# Patient Record
Sex: MALE | Race: OTHER | NOT HISPANIC OR LATINO | URBAN - METROPOLITAN AREA
[De-identification: names, ages, dates, MRNs, and addresses within clinical notes are randomized per-mention and may not be internally consistent; named-entity substitution may affect disease eponyms.]

---

## 2019-08-25 ENCOUNTER — EMERGENCY (EMERGENCY)
Facility: HOSPITAL | Age: 24
LOS: 0 days | Discharge: HOME | End: 2019-08-25
Admitting: EMERGENCY MEDICINE
Payer: COMMERCIAL

## 2019-08-25 VITALS
HEART RATE: 68 BPM | RESPIRATION RATE: 18 BRPM | SYSTOLIC BLOOD PRESSURE: 158 MMHG | DIASTOLIC BLOOD PRESSURE: 91 MMHG | TEMPERATURE: 98 F | WEIGHT: 289.91 LBS | OXYGEN SATURATION: 97 %

## 2019-08-25 DIAGNOSIS — K08.89 OTHER SPECIFIED DISORDERS OF TEETH AND SUPPORTING STRUCTURES: ICD-10-CM

## 2019-08-25 PROCEDURE — 99283 EMERGENCY DEPT VISIT LOW MDM: CPT

## 2019-08-25 RX ORDER — AMOXICILLIN 250 MG/5ML
1 SUSPENSION, RECONSTITUTED, ORAL (ML) ORAL
Qty: 20 | Refills: 0
Start: 2019-08-25 | End: 2019-09-03

## 2019-08-25 RX ORDER — IBUPROFEN 200 MG
600 TABLET ORAL ONCE
Refills: 0 | Status: COMPLETED | OUTPATIENT
Start: 2019-08-25 | End: 2019-08-25

## 2019-08-25 RX ADMIN — Medication 600 MILLIGRAM(S): at 14:52

## 2019-08-25 NOTE — ED PROVIDER NOTE - PHYSICAL EXAMINATION
VITALS:  I have reviewed the initial vital signs.  GENERAL: Well-developed, well-nourished, in no acute distress. Nontoxic.  HEENT: Sclera clear. No conjunctival injection. EOMI, PERRLA. Tolerating oral secretions. No trismus. tooth #31/32 ttp. no abscess, bleeding, drainage. poor dentition. Mucous membranes moist, oropharynx nonerythematous without swelling or lesions. Tonsils 2+ bilaterally and w/o exudate. Uvula midline and without edema. TM's clear b/l without bulging or erythema. Nasal turbinates clear and w/o discharge. No sinus ttp.  NECK: supple w FROM. No cervical adenopathy.  CARDIO: RRR, nl S1 and S2. No murmurs, rubs, or gallops.  PULM: Normal effort. No tachypnea or retractions. CTA b/l without wheezes, rales, or rhonchi.  SKIN: Warm, dry. No pallor or rashes. Capillary refill <2 seconds.  NEURO: A&Ox3. Speech clear. No gross motor/sensory deficits.

## 2019-08-25 NOTE — ED PROVIDER NOTE - OBJECTIVE STATEMENT
24 year old male w no significant pmhx presents to the ED with right lower tooth pain x 5 days. Pain is intermittent, throbbing, radiates into his right ear, worse when chewing on the affected side. Denies fevers/chills, URI sx, sore throat, difficulty swallowing/opening jaw, decreased po, myalgias, injury/trauma, recent dental procedures. States he does not have a dentist nor see one regularly.

## 2019-08-25 NOTE — ED PROVIDER NOTE - CLINICAL SUMMARY MEDICAL DECISION MAKING FREE TEXT BOX
24 year old healthy male here with 5 days of right lower dental pain to tooth #31/32, poor dentition. amoxicillin sent to pharmacy. patient to f/u with dental. I have discussed the discharge plan with the patient. The patient agrees with the plan, as discussed.  The patient understands Emergency Department diagnosis is a preliminary diagnosis often based on limited information and that the patient must adhere to the follow-up plan as discussed.  The patient understands that if the symptoms worsen or if prescribed medications do not have the desired/planned effect that the patient may return to the Emergency Department at any time for further evaluation and treatment.

## 2019-08-25 NOTE — ED PROVIDER NOTE - NSFOLLOWUPCLINICS_GEN_ALL_ED_FT
[facility]  Dental  [address] [place] [id number]  Phone: [phone]  Fax:   Follow Up Time: 1-3 Days

## 2019-08-25 NOTE — ED PROVIDER NOTE - NS ED ROS FT
CONSTITUTIONAL: (-) fevers, (-) chills, (-) decreased appetite  EYES: (-) eye redness, (-) eye discharge, (-) tearing  ENT: see HPI  NECK: (-) neck pain, (-) neck stiffness, (-) lymphadenopathy  PULM: (-) cough, (-) sputum, (-) shortness of breath  GI: (-) nausea, (-) vomiting    *all other systems negative except as documented above and in the HPI*

## 2019-08-26 ENCOUNTER — EMERGENCY (EMERGENCY)
Facility: HOSPITAL | Age: 24
LOS: 0 days | Discharge: HOME | End: 2019-08-26
Attending: EMERGENCY MEDICINE | Admitting: EMERGENCY MEDICINE
Payer: COMMERCIAL

## 2019-08-26 VITALS
TEMPERATURE: 98 F | RESPIRATION RATE: 18 BRPM | SYSTOLIC BLOOD PRESSURE: 139 MMHG | OXYGEN SATURATION: 99 % | DIASTOLIC BLOOD PRESSURE: 85 MMHG | HEART RATE: 66 BPM

## 2019-08-26 DIAGNOSIS — K08.89 OTHER SPECIFIED DISORDERS OF TEETH AND SUPPORTING STRUCTURES: ICD-10-CM

## 2019-08-26 PROBLEM — Z78.9 OTHER SPECIFIED HEALTH STATUS: Chronic | Status: ACTIVE | Noted: 2019-08-25

## 2019-08-26 PROCEDURE — 99282 EMERGENCY DEPT VISIT SF MDM: CPT

## 2019-08-26 NOTE — CONSULT NOTE ADULT - SUBJECTIVE AND OBJECTIVE BOX
S: 23 yo male patient presented to clinic with lower right swelling and pain.     O: Lower right, slight swelling observed. No fever seen.     A: Moderate swelling seen on mandibular right side. Periapical pathology seen on #30 and #31 (both teeth previously treated with root canal therapy and have crowns present). Radiograph taken of #30 and #31. Percussion test showed that #30 had pain of 10 and #31 had pain of 2. Determined that #30 was the source of the patient's pain at this visit.     P: Recommended extraction of #30 or re-treat of root canal therapy of #30. Patient elected to have #30 extracted at this visit. Explained risks and benefits of procedure as per OS sheet dated 07/13/2000. Consent obtained and side site completed. Anesthetized using 4 carpules 4% septocaine with 1:100,000 epinephrine and 3 carpules mepivicaine via inferior alveolar block and buccal infiltration. Elevated using elevators and delivered with cowhorn forceps. Hemostasis acquired with pressure. Curetted area. Post-op radiograph taken. Post-op instructions given. No complications.     Prescribed 600 mg ibuprofen for pain and 500 mg amoxicillin for infection.     Recommended that the patient find a private dentist to have routine dental care. Informed patient of periapical lesion that is seen on #31 and that there is an infection present. Informed the patient that he needs to have that tooth taken care of due to potential of another infection in the area. Patient acknowledges and understands.     Barb Greene DDS, [id number] (spectra number)

## 2019-08-26 NOTE — ED PROVIDER NOTE - NS ED ROS FT
Constitutional: See HPI.  ENMT: see hpi  Respiratory: No cough or sob  GI: No nausea, vomiting  Neuro: No headache  Skin: No skin rash.

## 2019-08-26 NOTE — ED PROVIDER NOTE - PHYSICAL EXAMINATION
CONSTITUTIONAL: WA / WN / NAD  HEAD: NCAT  EYES: PERRL; EOMI;   ENT:  + tenderness to palpation tooth #31  NECK: Supple  SKIN: Warm and dry;   NEURO: AAOx3  PSYCH: Memory Intact, Normal Affect

## 2019-08-26 NOTE — ED PROVIDER NOTE - OBJECTIVE STATEMENT
24 year old male no pmh presents here for dental pain. 24 year old male no pmh presents here for dental pain. Patient states pain is in the right side of bottom of his mouth. No fevers, no difficulty swallowing.

## 2019-08-26 NOTE — ED PROVIDER NOTE - CLINICAL SUMMARY MEDICAL DECISION MAKING FREE TEXT BOX
24 male here for dental clinic advised to return to ED when clinic open after visit yesterday no acute issues will discharge to dental clinic.

## 2019-08-26 NOTE — ED PROVIDER NOTE - ATTENDING CONTRIBUTION TO CARE
I personally evaluated the patient. I reviewed the Resident’s or Physician Assistant’s note (as assigned above), and agree with the findings and plan except as documented in my note.     24 male here for right molar pain. No constitutional symptoms. Advised to come back to ED for repeat dental clinic visit.      PE: male in no distress. HEENT: #31 tender to percussion. no facial swelling or trismus.     Impr: dental pain    Plan: discharge to dental clinic